# Patient Record
Sex: FEMALE | Race: WHITE | NOT HISPANIC OR LATINO
[De-identification: names, ages, dates, MRNs, and addresses within clinical notes are randomized per-mention and may not be internally consistent; named-entity substitution may affect disease eponyms.]

---

## 2017-03-29 ENCOUNTER — OTHER (OUTPATIENT)
Age: 62
End: 2017-03-29

## 2017-04-06 ENCOUNTER — APPOINTMENT (OUTPATIENT)
Dept: ORTHOPEDIC SURGERY | Facility: CLINIC | Age: 62
End: 2017-04-06

## 2017-04-12 ENCOUNTER — OTHER (OUTPATIENT)
Age: 62
End: 2017-04-12

## 2017-04-20 ENCOUNTER — APPOINTMENT (OUTPATIENT)
Dept: ORTHOPEDIC SURGERY | Facility: CLINIC | Age: 62
End: 2017-04-20

## 2017-04-20 VITALS
BODY MASS INDEX: 21.53 KG/M2 | HEART RATE: 82 BPM | DIASTOLIC BLOOD PRESSURE: 77 MMHG | HEIGHT: 62 IN | WEIGHT: 117 LBS | SYSTOLIC BLOOD PRESSURE: 135 MMHG

## 2017-05-16 ENCOUNTER — APPOINTMENT (OUTPATIENT)
Dept: OBGYN | Facility: CLINIC | Age: 62
End: 2017-05-16

## 2017-05-16 VITALS
SYSTOLIC BLOOD PRESSURE: 126 MMHG | WEIGHT: 118 LBS | DIASTOLIC BLOOD PRESSURE: 80 MMHG | BODY MASS INDEX: 21.71 KG/M2 | HEIGHT: 62 IN

## 2017-05-16 DIAGNOSIS — M25.551 PAIN IN RIGHT HIP: ICD-10-CM

## 2017-05-16 DIAGNOSIS — Z96.649 AFTERCARE FOLLOWING JOINT REPLACEMENT SURGERY: ICD-10-CM

## 2017-05-16 DIAGNOSIS — Z87.39 PERSONAL HISTORY OF OTHER DISEASES OF THE MUSCULOSKELETAL SYSTEM AND CONNECTIVE TISSUE: ICD-10-CM

## 2017-05-16 DIAGNOSIS — Z47.1 AFTERCARE FOLLOWING JOINT REPLACEMENT SURGERY: ICD-10-CM

## 2017-05-16 LAB
BILIRUB UR QL STRIP: NORMAL
COLLECTION METHOD: NORMAL
GLUCOSE UR-MCNC: NORMAL
HCG UR QL: 0.2 EU/DL
HGB UR QL STRIP.AUTO: NORMAL
KETONES UR-MCNC: NORMAL
LEUKOCYTE ESTERASE UR QL STRIP: NORMAL
NITRITE UR QL STRIP: NORMAL
PH UR STRIP: 7
PROT UR STRIP-MCNC: NORMAL
SP GR UR STRIP: 1.01

## 2017-05-22 LAB
CYTOLOGY CVX/VAG DOC THIN PREP: NORMAL
HPV HIGH+LOW RISK DNA PNL CVX: NEGATIVE

## 2018-03-14 ENCOUNTER — OTHER (OUTPATIENT)
Age: 63
End: 2018-03-14

## 2018-05-02 ENCOUNTER — OTHER (OUTPATIENT)
Age: 63
End: 2018-05-02

## 2018-05-10 ENCOUNTER — APPOINTMENT (OUTPATIENT)
Dept: ORTHOPEDIC SURGERY | Facility: CLINIC | Age: 63
End: 2018-05-10
Payer: COMMERCIAL

## 2018-05-10 VITALS
DIASTOLIC BLOOD PRESSURE: 86 MMHG | HEART RATE: 83 BPM | HEIGHT: 62 IN | SYSTOLIC BLOOD PRESSURE: 148 MMHG | BODY MASS INDEX: 21.71 KG/M2 | WEIGHT: 118 LBS

## 2018-05-10 DIAGNOSIS — M70.70 OTHER BURSITIS OF HIP, UNSPECIFIED HIP: ICD-10-CM

## 2018-05-10 PROCEDURE — 20610 DRAIN/INJ JOINT/BURSA W/O US: CPT | Mod: RT

## 2018-05-10 PROCEDURE — 99213 OFFICE O/P EST LOW 20 MIN: CPT | Mod: 25

## 2018-05-10 PROCEDURE — 73502 X-RAY EXAM HIP UNI 2-3 VIEWS: CPT | Mod: RT

## 2018-08-09 ENCOUNTER — APPOINTMENT (OUTPATIENT)
Dept: OBGYN | Facility: CLINIC | Age: 63
End: 2018-08-09
Payer: COMMERCIAL

## 2018-08-09 VITALS
WEIGHT: 130 LBS | DIASTOLIC BLOOD PRESSURE: 75 MMHG | HEIGHT: 62 IN | SYSTOLIC BLOOD PRESSURE: 126 MMHG | BODY MASS INDEX: 23.92 KG/M2

## 2018-08-09 DIAGNOSIS — Z12.11 ENCOUNTER FOR SCREENING FOR MALIGNANT NEOPLASM OF COLON: ICD-10-CM

## 2018-08-09 DIAGNOSIS — Z01.419 ENCOUNTER FOR GYNECOLOGICAL EXAMINATION (GENERAL) (ROUTINE) W/OUT ABNORMAL FINDINGS: ICD-10-CM

## 2018-08-09 DIAGNOSIS — Z12.31 ENCOUNTER FOR SCREENING MAMMOGRAM FOR MALIGNANT NEOPLASM OF BREAST: ICD-10-CM

## 2018-08-09 LAB
BILIRUB UR QL STRIP: NORMAL
COLLECTION METHOD: NORMAL
GLUCOSE UR-MCNC: NORMAL
HCG UR QL: 0.2 EU/DL
HEMOCCULT SP1 STL QL: NEGATIVE
HGB UR QL STRIP.AUTO: NORMAL
KETONES UR-MCNC: NORMAL
LEUKOCYTE ESTERASE UR QL STRIP: NORMAL
NITRITE UR QL STRIP: NORMAL
PH UR STRIP: 7.5
PROT UR STRIP-MCNC: NORMAL
SP GR UR STRIP: 1.01

## 2018-08-09 PROCEDURE — 81003 URINALYSIS AUTO W/O SCOPE: CPT | Mod: QW

## 2018-08-09 PROCEDURE — 99396 PREV VISIT EST AGE 40-64: CPT

## 2018-08-09 PROCEDURE — 82270 OCCULT BLOOD FECES: CPT

## 2018-08-13 LAB
APPEARANCE: CLEAR
BACTERIA UR CULT: NORMAL
BACTERIA: NEGATIVE
BILIRUBIN URINE: NEGATIVE
BLOOD URINE: NEGATIVE
COLOR: YELLOW
GLUCOSE QUALITATIVE U: NEGATIVE MG/DL
HYALINE CASTS: 1 /LPF
KETONES URINE: NEGATIVE
LEUKOCYTE ESTERASE URINE: NEGATIVE
MICROSCOPIC-UA: NORMAL
NITRITE URINE: NEGATIVE
PH URINE: 7.5
PROTEIN URINE: NEGATIVE MG/DL
RED BLOOD CELLS URINE: 1 /HPF
SPECIFIC GRAVITY URINE: 1.01
SQUAMOUS EPITHELIAL CELLS: 0 /HPF
UROBILINOGEN URINE: NEGATIVE MG/DL
WHITE BLOOD CELLS URINE: 0 /HPF

## 2019-03-19 ENCOUNTER — OTHER (OUTPATIENT)
Age: 64
End: 2019-03-19

## 2019-03-19 DIAGNOSIS — M25.569 PAIN IN UNSPECIFIED KNEE: ICD-10-CM

## 2019-03-26 ENCOUNTER — TRANSCRIPTION ENCOUNTER (OUTPATIENT)
Age: 64
End: 2019-03-26

## 2019-03-26 ENCOUNTER — APPOINTMENT (OUTPATIENT)
Dept: ORTHOPEDIC SURGERY | Facility: CLINIC | Age: 64
End: 2019-03-26
Payer: MEDICARE

## 2019-03-26 VITALS
BODY MASS INDEX: 23.92 KG/M2 | SYSTOLIC BLOOD PRESSURE: 150 MMHG | DIASTOLIC BLOOD PRESSURE: 88 MMHG | HEIGHT: 62 IN | WEIGHT: 130 LBS | TEMPERATURE: 99.5 F | HEART RATE: 84 BPM

## 2019-03-26 DIAGNOSIS — M23.92 UNSPECIFIED INTERNAL DERANGEMENT OF LEFT KNEE: ICD-10-CM

## 2019-03-26 PROCEDURE — 99214 OFFICE O/P EST MOD 30 MIN: CPT

## 2019-03-26 PROCEDURE — 73564 X-RAY EXAM KNEE 4 OR MORE: CPT

## 2019-03-26 NOTE — ADDENDUM
[FreeTextEntry1] : This note was authored by Case Brown working as a medical scribe for Dr. Alexandre Lang. The note was reviewed, edited, and revised by Dr. Alexandre Lang whom is in agreement with the exam findings, imaging findings, and treatment plan. 03/26/2019.

## 2019-03-26 NOTE — DISCUSSION/SUMMARY
[de-identified] : The patient is a 64 year old female with early patellofemoral compartment arthritis and a possible medial meniscal tear of the left knee. She was sent for an MRI to evaluate this. She will follow up with the results of the MRI.

## 2019-03-26 NOTE — HISTORY OF PRESENT ILLNESS
[de-identified] : The patient is a 64 year old female being seen for evaluation of her left knee.She reports being diagnosed with lupus in November of 2018. She's always noted intermittent pain to both knees. In approximately 2 months ago she twisted her left knee while attempting to transfer out of bed. Since that time she's noted pain over the anterior aspect of the knee. She describes it as constant. It is worse with prolonged sitting, standing and weightbearing activities, most especially stair climbing. She reports associated swelling and weakness. She denies limping. She is unable to take anti-inflammatories due to stomach issues. She is on pain management and takes OxyContin and oxycodone.

## 2019-03-26 NOTE — PHYSICAL EXAM
[de-identified] : The patient appears well nourished  and in no apparent distress.  The patient is alert and oriented to person, place, and time.   Affect and mood appear normal. The head is normocephalic and atraumatic.  The eyes reveal normal sclera and extra ocular muscles are intact. The tongue is midline with no apparent lesions.  Skin shows normal turgor with no evidence of eczema or psoriasis.  No respiratory distress noted.  Sensation grossly intact.\par   [de-identified] : Exam of the left knee shows no effusion, positive patella grind, anterior knee pain with deep flexion, pain with palpation along the medial joint line, equivocal yadira with medial joint line pain during testing. 5/5 motor strength bilaterally distally. Sensation intact distally.  [de-identified] : Xray- 4 views of the left knee shows early arthritis of the patellofemoral compartment of the left knee.

## 2019-03-28 ENCOUNTER — APPOINTMENT (OUTPATIENT)
Dept: MRI IMAGING | Facility: CLINIC | Age: 64
End: 2019-03-28
Payer: MEDICARE

## 2019-03-28 ENCOUNTER — OUTPATIENT (OUTPATIENT)
Dept: OUTPATIENT SERVICES | Facility: HOSPITAL | Age: 64
LOS: 1 days | End: 2019-03-28
Payer: MEDICARE

## 2019-03-28 DIAGNOSIS — Z00.8 ENCOUNTER FOR OTHER GENERAL EXAMINATION: ICD-10-CM

## 2019-03-28 DIAGNOSIS — Z98.51 TUBAL LIGATION STATUS: Chronic | ICD-10-CM

## 2019-03-28 DIAGNOSIS — M23.92 UNSPECIFIED INTERNAL DERANGEMENT OF LEFT KNEE: ICD-10-CM

## 2019-03-28 PROCEDURE — 73721 MRI JNT OF LWR EXTRE W/O DYE: CPT | Mod: 26,LT

## 2019-03-28 PROCEDURE — 73721 MRI JNT OF LWR EXTRE W/O DYE: CPT

## 2019-04-01 ENCOUNTER — OTHER (OUTPATIENT)
Age: 64
End: 2019-04-01

## 2019-04-02 ENCOUNTER — APPOINTMENT (OUTPATIENT)
Age: 64
End: 2019-04-02
Payer: MEDICARE

## 2019-04-02 VITALS
DIASTOLIC BLOOD PRESSURE: 83 MMHG | TEMPERATURE: 98.1 F | HEART RATE: 93 BPM | BODY MASS INDEX: 23.92 KG/M2 | SYSTOLIC BLOOD PRESSURE: 152 MMHG | WEIGHT: 130 LBS | HEIGHT: 62 IN

## 2019-04-02 PROCEDURE — 20610 DRAIN/INJ JOINT/BURSA W/O US: CPT

## 2019-04-02 PROCEDURE — 99213 OFFICE O/P EST LOW 20 MIN: CPT | Mod: 25

## 2019-04-02 NOTE — PHYSICAL EXAM
[de-identified] : The patient appears well nourished  and in no apparent distress.  The patient is alert and oriented to person, place, and time.   Affect and mood appear normal. The head is normocephalic and atraumatic.  The eyes reveal normal sclera and extra ocular muscles are intact. The tongue is midline with no apparent lesions.  Skin shows normal turgor with no evidence of eczema or psoriasis.  No respiratory distress noted.  Sensation grossly intact.\par   [de-identified] : Exam of the left knee shows no effusion, positive patella grind, anterior knee pain with deep flexion, pain with palpation along the medial joint line. 5/5 motor strength bilaterally distally. Sensation intact distally.  [de-identified] : MRI - Left knee March 20, 2019 shows multiple moderate knee effusion.  Fissuring cartilage medial facet patella.  No displaced meniscal tear.\par \par

## 2019-04-02 NOTE — ADDENDUM
[FreeTextEntry1] : This note was authored by Case Brown working as a medical scribe for Dr. Alexandre Lang. The note was reviewed, edited, and revised by Dr. Alexandre Lang whom is in agreement with the exam findings, imaging findings, and treatment plan. 04/02/2019.

## 2019-04-02 NOTE — PROCEDURE
[de-identified] : Using sterile technique, 2cc of depomedrol 40mg/ml, 4cc of 1% plain lidocaine, and 2 cc 0.25% marcaine was drawn up into a sterile syringe. The left knee was then sterilely prepped with chlorhexidine. Ethyl chloride spray was used to anesthetize the skin and subQ tissue. The depomedrol/lidocaine/marcaine mixture was then injected into the knee joint in the anterolateral position. The patient tolerated the procedure well without difficulty. The patient was given instructions on the use of ice and anti-inflammatories post injection site soreness.\par \par

## 2019-04-02 NOTE — HISTORY OF PRESENT ILLNESS
[de-identified] : The patient is a 64 year old female being seen for evaluation of her left knee. Last seen in the office 1 week ago at which time she was sent for an MRI to evaluate for a meniscus tear of the left knee. She reports her pain has remained constant since her last visit. She is ambulating and transferring with intermittent pain. She comes in today to review the results of the MRI and for further treatment options.

## 2019-04-02 NOTE — PHYSICAL EXAM
[de-identified] : The patient appears well nourished  and in no apparent distress.  The patient is alert and oriented to person, place, and time.   Affect and mood appear normal. The head is normocephalic and atraumatic.  The eyes reveal normal sclera and extra ocular muscles are intact. The tongue is midline with no apparent lesions.  Skin shows normal turgor with no evidence of eczema or psoriasis.  No respiratory distress noted.  Sensation grossly intact.\par   [de-identified] : Exam of the left knee shows no effusion, positive patella grind, anterior knee pain with deep flexion, pain with palpation along the medial joint line. 5/5 motor strength bilaterally distally. Sensation intact distally.  [de-identified] : MRI - Left knee March 20, 2019 shows multiple moderate knee effusion.  Fissuring cartilage medial facet patella.  No displaced meniscal tear.\par \par

## 2019-04-02 NOTE — DISCUSSION/SUMMARY
[de-identified] : The patient is a 64 year old female with early arthritis of the patellofemoral compartment of the left knee. Conservative options were discussed. She was given a cortisone injection in the left knee today. She was given a prescription for physical therapy but declined. She may follow up as needed.

## 2019-04-02 NOTE — PROCEDURE
[de-identified] : Using sterile technique, 2cc of depomedrol 40mg/ml, 4cc of 1% plain lidocaine, and 2 cc 0.25% marcaine was drawn up into a sterile syringe. The left knee was then sterilely prepped with chlorhexidine. Ethyl chloride spray was used to anesthetize the skin and subQ tissue. The depomedrol/lidocaine/marcaine mixture was then injected into the knee joint in the anterolateral position. The patient tolerated the procedure well without difficulty. The patient was given instructions on the use of ice and anti-inflammatories post injection site soreness.\par \par

## 2019-04-02 NOTE — HISTORY OF PRESENT ILLNESS
[de-identified] : The patient is a 64 year old female being seen for evaluation of her left knee. Last seen in the office 1 week ago at which time she was sent for an MRI to evaluate for a meniscus tear of the left knee. She reports her pain has remained constant since her last visit. She is ambulating and transferring with intermittent pain. She comes in today to review the results of the MRI and for further treatment options.

## 2019-04-02 NOTE — DISCUSSION/SUMMARY
[de-identified] : The patient is a 64 year old female with early arthritis of the patellofemoral compartment of the left knee. Conservative options were discussed. She was given a cortisone injection in the left knee today. She was given a prescription for physical therapy but declined. She may follow up as needed.

## 2019-08-12 ENCOUNTER — APPOINTMENT (OUTPATIENT)
Dept: OBGYN | Facility: CLINIC | Age: 64
End: 2019-08-12

## 2019-11-12 ENCOUNTER — OTHER (OUTPATIENT)
Age: 64
End: 2019-11-12

## 2019-11-19 ENCOUNTER — APPOINTMENT (OUTPATIENT)
Dept: ORTHOPEDIC SURGERY | Facility: CLINIC | Age: 64
End: 2019-11-19
Payer: MEDICARE

## 2019-11-19 VITALS
HEIGHT: 62 IN | SYSTOLIC BLOOD PRESSURE: 144 MMHG | BODY MASS INDEX: 23.92 KG/M2 | HEART RATE: 87 BPM | DIASTOLIC BLOOD PRESSURE: 83 MMHG | WEIGHT: 130 LBS

## 2019-11-19 PROCEDURE — 20611 DRAIN/INJ JOINT/BURSA W/US: CPT

## 2019-11-19 PROCEDURE — 99213 OFFICE O/P EST LOW 20 MIN: CPT | Mod: 25

## 2019-11-19 PROCEDURE — 73564 X-RAY EXAM KNEE 4 OR MORE: CPT | Mod: LT

## 2019-11-19 NOTE — ADDENDUM
[FreeTextEntry1] : This note was authored by Case Brown working as a medical scribe for Dr. Alexandre Lang. The note was reviewed, edited, and revised by Dr. Alexandre Lang whom is in agreement with the exam findings, imaging findings, and treatment plan. 11/19/2019.

## 2019-11-19 NOTE — PHYSICAL EXAM
[de-identified] : The patient appears well nourished  and in no apparent distress.  The patient is alert and oriented to person, place, and time.   Affect and mood appear normal. The head is normocephalic and atraumatic.  The eyes reveal normal sclera and extra ocular muscles are intact. The tongue is midline with no apparent lesions.  Skin shows normal turgor with no evidence of eczema or psoriasis.  No respiratory distress noted.  Sensation grossly intact.\par   [de-identified] : Exam of the left knee shows a minimal effusion, anteromedial and medial joint line tenderness to palpation, full extension, flexion of 135 degrees. 5/5 motor strength bilaterally distally. Sensation intact distally.  [de-identified] : Xray- 4 views of the left knee shows mild arthritis of the medial and patellofemoral compartment of the left knee.

## 2019-11-19 NOTE — HISTORY OF PRESENT ILLNESS
[de-identified] : The patient is a 64 year old female being seen for evaluation of her left knee. She denies injury, trauma, or change of activity. Last seen in the office in April of this year at which time she received a cortisone injection in the left knee. She reports 6 months of good relief of symptoms. She is ambulating and transferring well, but continues to report pain and stiffness. She reports associated swelling. She reports pain is centered in the medial aspect of the right knee. She reports taking anti-inflammatories with mild relief of symptoms. She comes in today for repeat evaluation and for further treatment options.  She reports being recently told by her rheumatologist that she has rheumatoid arthritis.  She was just started on methotrexate.

## 2019-11-19 NOTE — PROCEDURE
[de-identified] : Using sterile technique, 2cc of depomedrol 40mg/ml, 4cc of 1% plain lidocaine, and 2 cc 0.25% marcaine was drawn up into a sterile syringe. The left knee joint space was identified using ultrasound. The left knee was then sterilely prepped with chlorhexidine. Ethyl chloride spray was used to anesthetize the skin and subQ tissue. The depomedrol/lidocaine/marcaine mixture was then injected into the knee joint in the superolateral position under ultrasound guidance and the needle position in the joint was confirmed. The patient tolerated the procedure well without difficulty. The patient was given instructions on the use of ice and anti-inflammatories post injection site soreness.

## 2019-11-19 NOTE — REASON FOR VISIT
[Other: ____] : [unfilled] [Follow-Up Visit] : a follow-up visit for [FreeTextEntry2] : S/P Right direct anterior approach total hip replacement. DOS: 03/18/2016. \par \par

## 2019-11-19 NOTE — DISCUSSION/SUMMARY
[de-identified] : The patient is a 64 year old female with mild arthritis of the medial and patellofemoral compartment of the left knee. Conservative options were discussed. She was given a cortisone injection in the left knee under ultrasound-guidance as she had 6 months of good relief from her previous injection. She may follow up as needed.

## 2020-02-26 ENCOUNTER — APPOINTMENT (OUTPATIENT)
Dept: OBGYN | Facility: CLINIC | Age: 65
End: 2020-02-26

## 2020-09-29 ENCOUNTER — APPOINTMENT (OUTPATIENT)
Dept: ORTHOPEDIC SURGERY | Facility: CLINIC | Age: 65
End: 2020-09-29
Payer: MEDICARE

## 2020-09-29 VITALS
BODY MASS INDEX: 23.92 KG/M2 | DIASTOLIC BLOOD PRESSURE: 81 MMHG | WEIGHT: 130 LBS | HEART RATE: 71 BPM | SYSTOLIC BLOOD PRESSURE: 148 MMHG | HEIGHT: 62 IN

## 2020-09-29 DIAGNOSIS — M25.551 PAIN IN RIGHT HIP: ICD-10-CM

## 2020-09-29 DIAGNOSIS — M25.561 PAIN IN RIGHT KNEE: ICD-10-CM

## 2020-09-29 PROCEDURE — 73564 X-RAY EXAM KNEE 4 OR MORE: CPT | Mod: RT

## 2020-09-29 PROCEDURE — 20610 DRAIN/INJ JOINT/BURSA W/O US: CPT | Mod: RT

## 2020-09-29 PROCEDURE — 76942 ECHO GUIDE FOR BIOPSY: CPT | Mod: RT,59

## 2020-09-29 PROCEDURE — 99214 OFFICE O/P EST MOD 30 MIN: CPT | Mod: 25

## 2020-09-29 NOTE — REASON FOR VISIT
[Follow-Up Visit] : a follow-up visit for [Other: ____] : [unfilled] [FreeTextEntry2] : S/P Right direct anterior approach total hip replacement. DOS: 03/18/2016.

## 2020-09-29 NOTE — PROCEDURE
[de-identified] : Using sterile technique, 2cc of depomedrol 40mg/ml, 4cc of 1% plain lidocaine, and 2 cc 0.25% marcaine was drawn up into a sterile syringe. The right knee joint space was identified using ultrasound. The right knee was then sterilely prepped with chlorhexidine. Ethyl chloride spray was used to anesthetize the skin and subQ tissue. The depomedrol/lidocaine/marcaine mixture was then injected into the knee joint in the superolateral position under ultrasound guidance and the needle position in the joint space was confirmed. The patient tolerated the procedure well without difficulty. The patient was given instructions on the use of ice and anti-inflammatories post injection site soreness.

## 2020-09-29 NOTE — PHYSICAL EXAM
[de-identified] : The patient appears well nourished  and in no apparent distress.  The patient is alert and oriented to person, place, and time.   Affect and mood appear normal. The head is normocephalic and atraumatic.  The eyes reveal normal sclera and extra ocular muscles are intact. The tongue is midline with no apparent lesions.  Skin shows normal turgor with no evidence of eczema or psoriasis.  No respiratory distress noted.  Sensation grossly intact.\par   [de-identified] : Exam of the right knee shows full extension, medial joint line tenderness to palpation, flexion of 130 degrees with patellofemoral crepitus. 5/5 motor strength bilaterally distally. Sensation intact distally.  [de-identified] : Xray- 4 views of the right knee shows moderate arthritis of the medial compartment of the right knee.

## 2020-09-29 NOTE — ADDENDUM
[FreeTextEntry1] : This note was authored by Case Brown working as a medical scribe for Dr. Alexandre Lang. The note was reviewed, edited, and revised by Dr. Alexandre Lang whom is in agreement with the exam findings, imaging findings, and treatment plan. 09/29/2020.

## 2020-09-29 NOTE — HISTORY OF PRESENT ILLNESS
[de-identified] : The patient is a 65 year old female being seen for evaluation of her right hip. She denies injury, trauma, or change of activity. She has a history of rheumatid arthritis. She is ambulating and transferring with severe pain and stiffness. She reports pain is centered in the medial aspect of the right knee. She reports intermittent swelling. She reports occasional instability. She reports pain at rest. Pain is worse with transferring and weightbearing activities, most especially stair climbing. She reports using oxycodone and OxyContin with mild relief of her symptoms. She comes in today for evaluation of her right knee and for treatment options.

## 2020-09-29 NOTE — DISCUSSION/SUMMARY
[de-identified] : The patient is a 65 year old female with moderate arthritis of the medial compartment of the right knee. She was given a cortisone injection in the right knee today under ultrasound-guidance. She was given a prescription for physical therapy. She was referred to physiatry for evaluation of her lumbar spine.  I recommended postponing surgery as long as possible with nonoperative treatment.  She does not tolerate NSAIDs because of GI upset.  Follow-up in 2 months.

## 2020-10-05 ENCOUNTER — TRANSCRIPTION ENCOUNTER (OUTPATIENT)
Age: 65
End: 2020-10-05

## 2021-01-26 ENCOUNTER — APPOINTMENT (OUTPATIENT)
Dept: ORTHOPEDIC SURGERY | Facility: CLINIC | Age: 66
End: 2021-01-26
Payer: MEDICARE

## 2021-01-26 VITALS
HEIGHT: 62 IN | BODY MASS INDEX: 23.92 KG/M2 | DIASTOLIC BLOOD PRESSURE: 82 MMHG | SYSTOLIC BLOOD PRESSURE: 148 MMHG | HEART RATE: 78 BPM | WEIGHT: 130 LBS

## 2021-01-26 DIAGNOSIS — Z96.649 PRESENCE OF UNSPECIFIED ARTIFICIAL HIP JOINT: ICD-10-CM

## 2021-01-26 PROCEDURE — 73502 X-RAY EXAM HIP UNI 2-3 VIEWS: CPT | Mod: RT

## 2021-01-26 PROCEDURE — 99214 OFFICE O/P EST MOD 30 MIN: CPT | Mod: 25

## 2021-01-26 PROCEDURE — 73564 X-RAY EXAM KNEE 4 OR MORE: CPT | Mod: 50

## 2021-01-26 PROCEDURE — 20610 DRAIN/INJ JOINT/BURSA W/O US: CPT | Mod: 50

## 2021-01-26 NOTE — DISCUSSION/SUMMARY
[de-identified] : The patient is a 65 year old female with possible medial meniscal tears of both knees in the setting of mild to moderate arthritis of the medial compartment of the right knee and mild to moderate medial and patellofemoral compartment arthritis of the left knee. She was given a cortisone injection in both knees today. She was sent for an MRI to evaluate the medial meniscus of both knees. We discussed the use of over-the-counter anti-inflammatories as well as activity modification and ice as needed. She will follow up with the results of the MRIs for further treatment options.

## 2021-01-26 NOTE — PHYSICAL EXAM
[de-identified] : The patient appears well nourished  and in no apparent distress.  The patient is alert and oriented to person, place, and time.   Affect and mood appear normal. The head is normocephalic and atraumatic.  The eyes reveal normal sclera and extra ocular muscles are intact. The tongue is midline with no apparent lesions.  Skin shows normal turgor with no evidence of eczema or psoriasis.  No respiratory distress noted.  Sensation grossly intact.\par   [de-identified] : Exam of the left knee shows no effusion, full extension, medial joint line tenderness to palpation, flexion of 130 degrees.\par Exam of the right knee shows no effusion, full extension, medial joint line tenderness to palpation, flexion of 130 degrees. 5/5 motor strength bilaterally distally. Sensation intact distally.  [de-identified] : Xray- 4 views of the right knee shows mild to moderate arthritis of the medial compartment of the right knee and mild to moderate medial and patellofemoral compartment arthritis of the left knee.\par \par Xray- AP pelvis and 2 views of the right hip shows a hip replacement in stable position, without sign of fracture or dislocation. There is stable grade 2 heterotopic ossification.

## 2021-01-26 NOTE — HISTORY OF PRESENT ILLNESS
[de-identified] : Patient presents today for evaluation of a right anterior total hip arthroplasty. She is approaching 5 years from her surgery. She reports her developing some lateral hip pain about a year ago. She states it radiates distally towards the knee. She is unsure if this is coming from the knee. She is known to have chronic back pain for which she is treated with oxycodone and OxyContin. She reports at times at the right lower extremity does buckle. She denies any trauma or falls to the ground. She does not report any groin pain of the right hip. She reports of chronic knee pain. She was last seen in September and had a corticosteroid injection. That did provide a significant amount of relief. At this time the injection has worn off. She would like to consider having both knees injected today. She does report increasing left knee pain. She describes her knee pain is more lateral than medial. She reports the stiffness when standing for extended periods of time. No knees swelling is reported.

## 2021-01-26 NOTE — ADDENDUM
[FreeTextEntry1] : This note was authored by Case Brown working as a medical scribe for Dr. Alexandre Lang. The note was reviewed, edited, and revised by Dr. Alexandre Lang whom is in agreement with the exam findings, imaging findings, and treatment plan. 01/26/2021.

## 2021-01-26 NOTE — PROCEDURE
[de-identified] : Using sterile technique, 2cc of depomedrol 40mg/ml, 4cc of 1% plain lidocaine, and 2 cc 0.25% marcaine was drawn up into a sterile syringe. The right knee was then sterilely prepped with chlorhexidine. Ethyl chloride spray was used to anesthetize the skin and subQ tissue. The depomedrol/lidocaine/marcaine mixture was then injected into the knee joint in the anterolateral position. The patient tolerated the procedure well without difficulty. The patient was given instructions on the use of ice and anti-inflammatories post injection site soreness.\par \par Using sterile technique, 2cc of depomedrol 40mg/ml, 4cc of 1% plain lidocaine, and 2 cc 0.25% marcaine was drawn up into a sterile syringe. The left knee was then sterilely prepped with chlorhexidine. Ethyl chloride spray was used to anesthetize the skin and subQ tissue. The depomedrol/lidocaine/marcaine mixture was then injected into the knee joint in the anterolateral position. The patient tolerated the procedure well without difficulty. The patient was given instructions on the use of ice and anti-inflammatories post injection site soreness.\par \par

## 2021-01-26 NOTE — REASON FOR VISIT
[Follow-Up Visit] : a follow-up visit for [Other: ____] : [unfilled] [FreeTextEntry2] : S/P Right direct anterior approach total hip replacement. DOS: 03/18/2016. \par  \par

## 2021-03-09 ENCOUNTER — APPOINTMENT (OUTPATIENT)
Dept: ORTHOPEDIC SURGERY | Facility: CLINIC | Age: 66
End: 2021-03-09
Payer: MEDICARE

## 2021-03-09 PROCEDURE — 99213 OFFICE O/P EST LOW 20 MIN: CPT | Mod: 25

## 2021-03-09 PROCEDURE — 20611 DRAIN/INJ JOINT/BURSA W/US: CPT | Mod: 50

## 2021-03-09 NOTE — DISCUSSION/SUMMARY
[de-identified] : The patient is a 66 year old female with mild to moderate medial compartment arthritis of both knees. Conservative options were discussed. She began Visco Supplementation in both knees today. We discussed the use of over-the-counter anti-inflammatories as well as activity modification and ice as needed. She will follow up next week for her second injection of Visco Supplementation.

## 2021-03-09 NOTE — REASON FOR VISIT
[Follow-Up Visit] : a follow-up visit for [Other: ____] : [unfilled] [FreeTextEntry2] : right hip MRI review.

## 2021-03-09 NOTE — ADDENDUM
[FreeTextEntry1] : This note was authored by Case Brown working as a medical scribe for Dr. Alexandre Lang. The note was reviewed, edited, and revised by Dr. Alexandre Lang whom is in agreement with the exam findings, imaging findings, and treatment plan. 03/09/2021.

## 2021-03-09 NOTE — PHYSICAL EXAM
[de-identified] : The patient appears well nourished  and in no apparent distress.  The patient is alert and oriented to person, place, and time.   Affect and mood appear normal. The head is normocephalic and atraumatic.  The eyes reveal normal sclera and extra ocular muscles are intact. The tongue is midline with no apparent lesions.  Skin shows normal turgor with no evidence of eczema or psoriasis.  No respiratory distress noted.  Sensation grossly intact.\par   [de-identified] : Exam of the left knee shows full extension, patellofemoral crepitus, flexion of 135 degrees.\par Exam of the right knee shows full extension, patellofemoral crepitus, flexion of 135 degrees. 5/5 motor strength bilaterally distally. Sensation intact distally.  [de-identified] : MRI - performed at [facility] on 2/8/2021 of the left knee- \par Mild tricompartmental degenerative changes most pronounced medial femorotibial compartment.\par Small to moderate knee joint effusion.\par \par MRI - performed at [facility] on 2/8/2021 of the right knee- \par Mild tricompartmental degenerative changes, most pronounced in the medial femorotibial compartment.\par Mild truncation of the free edge body medial meniscus.

## 2021-03-09 NOTE — PROCEDURE
[de-identified] : Allergies: The patient denies allergies to medications and has no allergies to chicken,eggs, or feathers.\par Procedure: The patient has been identified by name and date of birth. Patient confirms that we are treating the bilateral knees today.\par The bilateral knees were prepped in the usual sterile fashion. The bilateral knees joint space was identified using ultrasound. The areas were cleansed with chlorhexadine, then sprayed with ethyl chloride. The patient was then injected from a superolateral approach with the supartz into the bilateral knees using ultrasound guidance and the needle position in the joint space was confirmed. The patient tolerated the procedures well. The medication was delivered aseptically and atraumatically.\par Diagnosis: Osteoarthritis of the bilateral knees\par Treatment: The patient was advised on the activities for today. I gave the patient instructions on postinjection ice and analgesia.\par

## 2021-03-09 NOTE — HISTORY OF PRESENT ILLNESS
[de-identified] : Patient presents today for evaluation of a right anterior total hip arthroplasty. She is approaching 5 years from her surgery, presenting for follow up evaluation of bilateral knee pain. Patient was seen in office in January of 2021 and received a depo medrol injection to the bilateral knees. She notes approximately one month of pain relief, but has now worn off. She has not had gel injections but is interested in discussing them. She has also tried PT and home exercise without relief. Patient takes OTC NSAIDs and Tylenol as needed for pain with only temporary relief. SHe was sent for MRI of the bilateral knees to rule out medial meniscus tears, and presents today for follow up.

## 2021-03-16 ENCOUNTER — APPOINTMENT (OUTPATIENT)
Dept: ORTHOPEDIC SURGERY | Facility: CLINIC | Age: 66
End: 2021-03-16
Payer: MEDICARE

## 2021-03-16 PROCEDURE — 20611 DRAIN/INJ JOINT/BURSA W/US: CPT | Mod: 50

## 2021-03-16 NOTE — REASON FOR VISIT
[Follow-Up Visit] : a follow-up visit for [Other: ____] : [unfilled] [FreeTextEntry2] : Bilateral knee Supartz injections 2/5.

## 2021-03-16 NOTE — HISTORY OF PRESENT ILLNESS
[de-identified] : The patient is here today for a Supartz injection for the bilateral knees. The patient is having osteoarthritic symptoms. The patient was seen previously and was indicated for Supartz injections.\par Allergies: The patient denies allergies to medications and has no allergies to chicken,eggs, or feathers.\par Procedure: The patient has been identified by name and date of birth. Patient confirms that we are treating the bilateral knee today.\par The knee was prepped in the usual sterile fashion. The knee joint space was identified using ultrasound. The areas were cleansed with chlorhexadine, then sprayed with ethyl chloride. The patient was then injected with the Supartz into the bilateral knee using ultrasound guidance and the needle position in the joint space was confirmed. The patient tolerated the procedure well. The medication was delivered aseptically and atraumatically.\par Diagnosis: Osteoarthritis of the bilateral knee\par Treatment: The patient was advised on the activities for today. I gave the patient instructions on postinjection ice and analgesia.\par The patient will follow up in one week for their next injection to be administered.

## 2021-03-23 ENCOUNTER — APPOINTMENT (OUTPATIENT)
Dept: ORTHOPEDIC SURGERY | Facility: CLINIC | Age: 66
End: 2021-03-23
Payer: MEDICARE

## 2021-03-23 DIAGNOSIS — M17.12 UNILATERAL PRIMARY OSTEOARTHRITIS, LEFT KNEE: ICD-10-CM

## 2021-03-23 DIAGNOSIS — M17.11 UNILATERAL PRIMARY OSTEOARTHRITIS, RIGHT KNEE: ICD-10-CM

## 2021-03-23 PROCEDURE — 20611 DRAIN/INJ JOINT/BURSA W/US: CPT | Mod: 50

## 2021-03-23 NOTE — HISTORY OF PRESENT ILLNESS
[de-identified] : The patient is here today for the third Supartz injection for the bilateral knees. The patient is having osteoarthritic symptoms. The patient was seen previously and was indicated for Supartz injections.\par Allergies: The patient denies allergies to medications and has no allergies to chicken,eggs, or feathers.\par Procedure: The patient has been identified by name and date of birth. Patient confirms that we are treating the bilateral knee today.\par The knee was prepped in the usual sterile fashion. The knee joint space was identified using ultrasound. The areas were cleansed with chlorhexadine, then sprayed with ethyl chloride. The patient was then injected with the Supartz into the bilateral knee using ultrasound guidance and the needle position in the joint space was confirmed. The patient tolerated the procedure well. The medication was delivered aseptically and atraumatically.\par Diagnosis: Osteoarthritis of the bilateral knee\par Treatment: The patient was advised on the activities for today. I gave the patient instructions on postinjection ice and analgesia.\par The patient will follow up in one week for their next injection to be administered.

## 2021-03-23 NOTE — REASON FOR VISIT
[Follow-Up Visit] : a follow-up visit for [Other: ____] : [unfilled] [FreeTextEntry2] : Bilateral knee Supartz inx 3/5. LOT: [id number], EXP: 11/30/2023.

## 2021-03-30 ENCOUNTER — APPOINTMENT (OUTPATIENT)
Dept: ORTHOPEDIC SURGERY | Facility: CLINIC | Age: 66
End: 2021-03-30
Payer: MEDICARE

## 2021-03-30 PROCEDURE — 20611 DRAIN/INJ JOINT/BURSA W/US: CPT | Mod: 50

## 2021-03-30 NOTE — REASON FOR VISIT
[Follow-Up Visit] : a follow-up visit for [Other: ____] : [unfilled] [FreeTextEntry2] :  Bilateral knee Supartz inx 4/5. LOT: [id number], EXP: 11/30/2023. \par

## 2021-03-30 NOTE — HISTORY OF PRESENT ILLNESS
[de-identified] : The patient is here today for the fourth Supartz injection for the bilateral knees. The patient is having osteoarthritic symptoms. The patient was seen previously and was indicated for Supartz injections.\par Allergies: The patient denies allergies to medications and has no allergies to chicken,eggs, or feathers.\par Procedure: The patient has been identified by name and date of birth. Patient confirms that we are treating the bilateral knee today.\par The knee was prepped in the usual sterile fashion. The knee joint space was identified using ultrasound. The areas were cleansed with chlorhexadine, then sprayed with ethyl chloride. The patient was then injected with the Supartz into the bilateral knee using ultrasound guidance and the needle position in the joint space was confirmed. The patient tolerated the procedure well. The medication was delivered aseptically and atraumatically.\par Diagnosis: Osteoarthritis of the bilateral knee\par Treatment: The patient was advised on the activities for today. I gave the patient instructions on postinjection ice and analgesia.\par The patient will follow up in one week for their next injection to be administered.

## 2021-04-06 ENCOUNTER — APPOINTMENT (OUTPATIENT)
Dept: ORTHOPEDIC SURGERY | Facility: CLINIC | Age: 66
End: 2021-04-06
Payer: MEDICARE

## 2021-04-06 VITALS
HEART RATE: 86 BPM | DIASTOLIC BLOOD PRESSURE: 77 MMHG | WEIGHT: 130 LBS | BODY MASS INDEX: 23.92 KG/M2 | SYSTOLIC BLOOD PRESSURE: 131 MMHG | HEIGHT: 62 IN

## 2021-04-06 PROCEDURE — 20611 DRAIN/INJ JOINT/BURSA W/US: CPT | Mod: 50

## 2021-04-06 NOTE — HISTORY OF PRESENT ILLNESS
[de-identified] : The patient is here today for the fifth Supartz injection for the bilateral knees. The patient is having osteoarthritic symptoms. The patient was seen previously and was indicated for Supartz injections.\par Allergies: The patient denies allergies to medications and has no allergies to chicken,eggs, or feathers.\par Procedure: The patient has been identified by name and date of birth. Patient confirms that we are treating the bilateral knee today.\par The knee was prepped in the usual sterile fashion. The knee joint space was identified using ultrasound. The areas were cleansed with chlorhexadine, then sprayed with ethyl chloride. The patient was then injected with the Supartz into the bilateral knee using ultrasound guidance and the needle position in the joint space was confirmed. The patient tolerated the procedure well. The medication was delivered aseptically and atraumatically.\par Diagnosis: Osteoarthritis of the bilateral knee\par Treatment: The patient was advised on the activities for today. I gave the patient instructions on postinjection ice and analgesia.\par The patient will follow up in 6 weeks weeks.

## 2021-04-06 NOTE — REASON FOR VISIT
[Follow-Up Visit] : a follow-up visit for [Other: ____] : [unfilled] [FreeTextEntry2] : Bilateral knee Supartz inj 5/5. LOT: [id number], EXP: 11/30/2023.

## 2021-11-16 ENCOUNTER — APPOINTMENT (OUTPATIENT)
Dept: ORTHOPEDIC SURGERY | Facility: CLINIC | Age: 66
End: 2021-11-16

## 2022-02-02 ENCOUNTER — APPOINTMENT (OUTPATIENT)
Dept: OBGYN | Facility: CLINIC | Age: 67
End: 2022-02-02

## 2022-08-02 ENCOUNTER — APPOINTMENT (OUTPATIENT)
Dept: ORTHOPEDIC SURGERY | Facility: CLINIC | Age: 67
End: 2022-08-02

## 2022-08-02 VITALS
SYSTOLIC BLOOD PRESSURE: 139 MMHG | HEIGHT: 62 IN | BODY MASS INDEX: 23.92 KG/M2 | HEART RATE: 79 BPM | DIASTOLIC BLOOD PRESSURE: 77 MMHG | WEIGHT: 130 LBS

## 2022-08-02 PROCEDURE — 20611 DRAIN/INJ JOINT/BURSA W/US: CPT | Mod: 50

## 2022-08-02 PROCEDURE — 99214 OFFICE O/P EST MOD 30 MIN: CPT | Mod: 25

## 2022-08-02 PROCEDURE — 73564 X-RAY EXAM KNEE 4 OR MORE: CPT | Mod: 50

## 2022-08-02 NOTE — PHYSICAL EXAM
[de-identified] : The patient appears well nourished  and in no apparent distress.  The patient is alert and oriented to person, place, and time.   Affect and mood appear normal. The head is normocephalic and atraumatic.  The eyes reveal normal sclera and extra ocular muscles are intact. The tongue is midline with no apparent lesions.  Skin shows normal turgor with no evidence of eczema or psoriasis.  No respiratory distress noted.  Sensation grossly intact.		  [de-identified] : Exam of the right knee shows 0 to 125 degrees of flexion measured with a goniometer. There is no effusion. There is patellofemoral crepitance. \par Exam of the left knee shows 0 to 125 degrees of flexion measured with a goniometer. There is no effusion. There is patellofemoral crepitance (more than on the right). \par 5/5 motor strength bilaterally distally. Sensation intact distally.  [de-identified] : X-ray: 4 views of the right knee demonstrate mild patellofemoral compartment joint space narrowing.		 \par \par X-ray: 4 views of the left knee demonstrate mild patellofemoral compartment joint space narrowing.

## 2022-08-02 NOTE — HISTORY OF PRESENT ILLNESS
[de-identified] : Ms. BETO JONES is a 67 year old female presenting for evaluation of bilateral knee pain. Her pain is localized anteriorly and she notes it is worse with weightbearing activity as well as deep flexion. In the past she was diagnosed with osteoarthritis and treated conservatively thus far.  She completed gel injections to the bilateral knee on April 6, 2021, and reports about a month and a half worth of relief from these.  Prior to that in January 2021 she received steroid injections she states also did not provide lasting relief.  Patient denies she has tried therapy and home exercise without significant relief.  She takes oxycodone as per pain management.  She also has a history of rheumatoid arthritis.

## 2022-08-02 NOTE — PROCEDURE
[de-identified] : Allergies: The patient denies allergies to medications and has no allergies to chicken,eggs, or feathers.\par Procedure: The patient has been identified by name and date of birth. Patient confirms that we are treating the bilateral knee today.\par The knee was prepped in the usual sterile fashion. The knee joint space was identified using ultrasound. The areas were cleansed with chlorhexadine, then sprayed with ethyl chloride. The patient was then injected with the Genvisc into the bilateral knees using ultrasound guidance and the needle position in the superolateral joint space was confirmed. The patient tolerated the procedure well. The medication was delivered aseptically and atraumatically.\par Diagnosis: Osteoarthritis of the bilateral knees\par Treatment: The patient was advised on the activities for today. I gave the patient instructions on postinjection ice and analgesia.

## 2022-08-02 NOTE — DISCUSSION/SUMMARY
[de-identified] : BETO JONES is a 67 year female who presents with bilateral knee mild patellofemoral compartment arthritis. The patient will receive a series of gel injections in the bilateral knees, the first of which was provided in office today. She will receive Genvisc injections for this series. The patient will follow up for subsequent injection.

## 2022-08-02 NOTE — ADDENDUM
[FreeTextEntry1] : This note was authored by Johan Shelley working as a medical scribe for Dr. Alexandre Lang. The note was reviewed, edited, and revised by Dr. Alexandre Lang whom is in agreement with the exam findings, imaging findings, and treatment plan. 08/02/2022

## 2022-08-10 ENCOUNTER — APPOINTMENT (OUTPATIENT)
Dept: ORTHOPEDIC SURGERY | Facility: CLINIC | Age: 67
End: 2022-08-10

## 2022-08-10 VITALS
BODY MASS INDEX: 21.71 KG/M2 | OXYGEN SATURATION: 99 % | HEIGHT: 62 IN | HEART RATE: 67 BPM | SYSTOLIC BLOOD PRESSURE: 127 MMHG | WEIGHT: 118 LBS | DIASTOLIC BLOOD PRESSURE: 74 MMHG

## 2022-08-10 PROCEDURE — 20611 DRAIN/INJ JOINT/BURSA W/US: CPT | Mod: 50

## 2022-08-10 NOTE — PHYSICAL EXAM
[de-identified] : The patient appears well nourished  and in no apparent distress.  The patient is alert and oriented to person, place, and time.   Affect and mood appear normal. The head is normocephalic and atraumatic.  The eyes reveal normal sclera and extra ocular muscles are intact. The tongue is midline with no apparent lesions.  Skin shows normal turgor with no evidence of eczema or psoriasis.  No respiratory distress noted.  Sensation grossly intact.		  [de-identified] : Exam of the right knee shows 0 to 125 degrees of flexion measured with a goniometer. There is no effusion. There is patellofemoral crepitance. \par Exam of the left knee shows 0 to 125 degrees of flexion measured with a goniometer. There is no effusion. There is patellofemoral crepitance (more than on the right). \par 5/5 motor strength bilaterally distally. Sensation intact distally.  [de-identified] : Prior X-ray: 4 views of the right knee demonstrate mild patellofemoral compartment joint space narrowing.		 \par \par Prior X-ray: 4 views of the left knee demonstrate mild patellofemoral compartment joint space narrowing.

## 2022-08-10 NOTE — REASON FOR VISIT
[Follow-Up Visit] : a follow-up visit for [Other: ____] : [unfilled] [FreeTextEntry2] : Bilateral knee GenVisc Injection #1, Lot# [id number], Expires 2024/09/30

## 2022-08-10 NOTE — HISTORY OF PRESENT ILLNESS
[de-identified] : Ms. BETO JONES is a 67 year old female presenting for evaluation of bilateral knee pain. Her pain is localized anteriorly and she notes it is worse with weightbearing activity as well as deep flexion. In the past she was diagnosed with osteoarthritis and treated conservatively thus far.  She completed gel injections to the bilateral knee on April 6, 2021, and reports about a month and a half worth of relief from these.  Prior to that in January 2021 she received steroid injections she states also did not provide lasting relief.  Patient denies she has tried therapy and home exercise without significant relief.  She takes oxycodone as per pain management.  She also has a history of rheumatoid arthritis. Patient presents for the first Genvisc injections to the bilateral knees.

## 2022-08-10 NOTE — PROCEDURE
[de-identified] : Allergies: The patient denies allergies to medications and has no allergies to chicken,eggs, or feathers.\par Procedure: The patient has been identified by name and date of birth. Patient confirms that we are treating the bilateral knee today.\par The knee was prepped in the usual sterile fashion. The knee joint space was identified using ultrasound. The areas were cleansed with chlorhexadine, then sprayed with ethyl chloride. The patient was then injected with the Genvisc into the bilateral knee using ultrasound guidance  and the needle position in the superolateral joint space was confirmed. The patient tolerated the procedure well. The medication was delivered aseptically and atraumatically.\par Diagnosis: Osteoarthritis of the bilateral knee\par Treatment: The patient was advised on the activities for today. I gave the patient instructions on postinjection ice and analgesia.\par

## 2022-08-10 NOTE — DISCUSSION/SUMMARY
[de-identified] : BETO JONES is a 67 year female who presents with bilateral knee mild patellofemoral compartment arthritis. Patient received the first of five gel injections to the bilateral knees using sterile technique. She tolerated well. She will ice and elevate when home. Follow up in 1 week for next injection.

## 2022-08-17 ENCOUNTER — APPOINTMENT (OUTPATIENT)
Dept: ORTHOPEDIC SURGERY | Facility: CLINIC | Age: 67
End: 2022-08-17

## 2022-08-17 VITALS
HEART RATE: 60 BPM | HEIGHT: 62 IN | WEIGHT: 118 LBS | OXYGEN SATURATION: 99 % | BODY MASS INDEX: 21.71 KG/M2 | DIASTOLIC BLOOD PRESSURE: 78 MMHG | SYSTOLIC BLOOD PRESSURE: 130 MMHG

## 2022-08-17 PROCEDURE — 20611 DRAIN/INJ JOINT/BURSA W/US: CPT | Mod: 50

## 2022-08-17 NOTE — HISTORY OF PRESENT ILLNESS
[de-identified] : Patient is here for the bilateral Genvisc injection for the bilateral knees. \par Allergies: The patient denies allergies to medications and has no allergies to chicken,eggs, or feathers.\par Procedure: The patient has been identified by name and date of birth. Patient confirms that we are treating the bilateral knee today.\par The knee was prepped in the usual sterile fashion. The knee joint space was identified using ultrasound. The areas were cleansed with chlorhexadine, then sprayed with ethyl chloride. The patient was then injected with the Genvisc into the bilateral knee using ultrasound guidance  and the needle position in the superolateral joint space was confirmed. The patient tolerated the procedure well. The medication was delivered aseptically and atraumatically.\par Diagnosis: Osteoarthritis of the bilateral knee\par Treatment: The patient was advised on the activities for today. I gave the patient instructions on postinjection ice and analgesia.\par The patient will follow up in 1 week.

## 2022-08-17 NOTE — REASON FOR VISIT
[Other: ____] : [unfilled] [FreeTextEntry2] : Bilateral knee GenVisc Injection#3, Lot#[id number], Expires 2024/11/30

## 2022-08-24 ENCOUNTER — APPOINTMENT (OUTPATIENT)
Dept: ORTHOPEDIC SURGERY | Facility: CLINIC | Age: 67
End: 2022-08-24

## 2022-08-24 VITALS
DIASTOLIC BLOOD PRESSURE: 74 MMHG | OXYGEN SATURATION: 99 % | HEIGHT: 62 IN | BODY MASS INDEX: 21.71 KG/M2 | SYSTOLIC BLOOD PRESSURE: 136 MMHG | HEART RATE: 71 BPM | WEIGHT: 118 LBS

## 2022-08-24 PROCEDURE — 20611 DRAIN/INJ JOINT/BURSA W/US: CPT | Mod: 50

## 2022-08-24 NOTE — REASON FOR VISIT
[Other: ____] : [unfilled] [FreeTextEntry2] : Bilateral knee GenVisc Injection #4, Lot# [id number] Expires 2024/11/30

## 2022-08-24 NOTE — HISTORY OF PRESENT ILLNESS
[de-identified] : Patient is here for the fourth bilateral Genvisc injection for the bilateral knees. \par Allergies: The patient denies allergies to medications and has no allergies to chicken,eggs, or feathers.\par Procedure: The patient has been identified by name and date of birth. Patient confirms that we are treating the bilateral knee today.\par The knee was prepped in the usual sterile fashion. The knee joint space was identified using ultrasound. The areas were cleansed with chlorhexadine, then sprayed with ethyl chloride. The patient was then injected with the Genvisc into the bilateral knee using ultrasound guidance  and the needle position in the superolateral joint space was confirmed. The patient tolerated the procedure well. The medication was delivered aseptically and atraumatically.\par Diagnosis: Osteoarthritis of the bilateral knee\par Treatment: The patient was advised on the activities for today. I gave the patient instructions on postinjection ice and analgesia.\par The patient will follow up in 1 week.

## 2022-08-31 ENCOUNTER — APPOINTMENT (OUTPATIENT)
Dept: ORTHOPEDIC SURGERY | Facility: CLINIC | Age: 67
End: 2022-08-31

## 2022-08-31 VITALS
BODY MASS INDEX: 21.71 KG/M2 | DIASTOLIC BLOOD PRESSURE: 75 MMHG | SYSTOLIC BLOOD PRESSURE: 135 MMHG | HEIGHT: 62 IN | HEART RATE: 62 BPM | OXYGEN SATURATION: 99 % | WEIGHT: 118 LBS

## 2022-08-31 PROCEDURE — 20611 DRAIN/INJ JOINT/BURSA W/US: CPT | Mod: 50

## 2022-08-31 NOTE — REASON FOR VISIT
[Follow-Up Visit] : a follow-up visit for [Other: ____] : [unfilled] [FreeTextEntry2] : Bilateral knee GenVisc Injection #5, Lot#[id number], Expires 2024/09/30 and Lot# [id number] Expires 2024/11/30

## 2022-08-31 NOTE — HISTORY OF PRESENT ILLNESS
[de-identified] : Patient is here for the fifth Genvisc injection for the bilateral knees. \par Allergies: The patient denies allergies to medications and has no allergies to chicken,eggs, or feathers.\par Procedure: The patient has been identified by name and date of birth. Patient confirms that we are treating the bilateral knee today.\par The knee was prepped in the usual sterile fashion. The knee joint space was identified using ultrasound. The areas were cleansed with chlorhexadine, then sprayed with ethyl chloride. The patient was then injected with the Genvisc into the bilateral knee using ultrasound guidance  and the needle position in the superolateral joint space was confirmed. The patient tolerated the procedure well. The medication was delivered aseptically and atraumatically.\par Diagnosis: Osteoarthritis of the bilateral knee\par Treatment: The patient was advised on the activities for today. I gave the patient instructions on postinjection ice and analgesia.\par The patient will follow up in 6-8 weeks.

## 2023-04-11 ENCOUNTER — APPOINTMENT (OUTPATIENT)
Dept: ORTHOPEDIC SURGERY | Facility: CLINIC | Age: 68
End: 2023-04-11
Payer: MEDICARE

## 2023-04-11 VITALS
SYSTOLIC BLOOD PRESSURE: 168 MMHG | BODY MASS INDEX: 21.71 KG/M2 | DIASTOLIC BLOOD PRESSURE: 76 MMHG | WEIGHT: 118 LBS | HEIGHT: 62 IN

## 2023-04-11 PROCEDURE — 20611 DRAIN/INJ JOINT/BURSA W/US: CPT | Mod: 50

## 2023-04-11 PROCEDURE — 76942 ECHO GUIDE FOR BIOPSY: CPT | Mod: 59

## 2023-04-11 PROCEDURE — 73564 X-RAY EXAM KNEE 4 OR MORE: CPT | Mod: 50

## 2023-04-11 PROCEDURE — 99214 OFFICE O/P EST MOD 30 MIN: CPT | Mod: 25

## 2023-04-11 RX ORDER — CETIRIZINE HCL 10 MG
TABLET ORAL
Refills: 0 | Status: DISCONTINUED | COMMUNITY
End: 2023-04-11

## 2023-04-11 RX ORDER — EZETIMIBE 10 MG/1
TABLET ORAL
Refills: 0 | Status: DISCONTINUED | COMMUNITY
End: 2023-04-11

## 2023-04-11 RX ORDER — ESOMEPRAZOLE MAGNESIUM 5 MG/1
GRANULE, DELAYED RELEASE ORAL
Refills: 0 | Status: DISCONTINUED | COMMUNITY
End: 2023-04-11

## 2023-04-11 NOTE — HISTORY OF PRESENT ILLNESS
[de-identified] : Patient is a 68-year-old female presenting for evaluation of bilateral knee pain equal in intensity.  Her knee pain is localized mostly anteriorly and medially. Pain is worse with long periods of sitting and with weightbearing activity. Patient has intermittent swelling as well. Patient had gel injections on 8/31/22 however reports "no recollection of this". She has tired Pt and home exercise without improvement. Patient takes OTC NSAIDs and has tried Pt without improvement.

## 2023-04-11 NOTE — ADDENDUM
[FreeTextEntry1] : This note was authored by Johan Shelley working as a medical scribe for Dr. Alexandre Lang. The note was reviewed, edited, and revised by Dr. Alexandre Lang whom is in agreement with the exam findings, imaging findings, and treatment plan. 04/11/2023

## 2023-04-11 NOTE — PHYSICAL EXAM
[de-identified] : The patient appears well nourished  and in no apparent distress.  The patient is alert and oriented to person, place, and time.   Affect and mood appear normal. The head is normocephalic and atraumatic.  The eyes reveal normal sclera and extra ocular muscles are intact. The tongue is midline with no apparent lesions.  Skin shows normal turgor with no evidence of eczema or psoriasis.  No respiratory distress noted.  Sensation grossly intact.		  [de-identified] : Exam of the right knee shows 0 to 135 degrees of flexion measured with a goniometer. There is no effusion. Mild anterior knee pain with range of motion. There is no medial joint line tenderness. \par Exam of the left knee shows  0 to 135 degrees of flexion measured with a goniometer. There is no effusion. There is no pain with range of motion. There is no medial joint line tenderness. \par 5/5 motor strength bilaterally distally. Sensation intact distally.  [de-identified] : X-ray: 4 views of the right knee demonstrate mild patellofemoral and medial compartment joint space narrowing.		 \par X-ray: 4 views of the left knee demonstrate mild patellofemoral and medial compartment joint space narrowing. 	\par \par Right knee MRI Done by Haider Beltran on 11/14/2023\par Impression and results from my independent review in office today: \par Small knee joint effusion. No evidence for meniscal or ligament tears. 	 \par \par Left knee MRI Done By Haider Beltran on 11/14/2023\par Impression and results from my independent review in office today: \par Horizontal tear involving the posterior horn medial meniscus. \par Mild degenerative changes. \par  Normal for race

## 2023-04-11 NOTE — DISCUSSION/SUMMARY
[de-identified] : BETO JONES is a 68 year old female who presents with bilateral knee mild medial and patellofemoral compartment arthritis. Her pain is disproportionate to the level of arthritis noted on both MRI and x-ray and no other clear etiology for her pain could be determined. It was recommended that the patient follow up with her pain management physician regarding geniculate nerve ablation. For her arthritis, the patient received an intraarticular cortisone injection in the bilateral knees in the office today under ultrasound guidance. The patient will follow up 6 weeks post injection.

## 2023-04-11 NOTE — PROCEDURE
[de-identified] : Using sterile technique, 2cc of depomedrol 40mg/ml, 4cc of 1% plain lidocaine, and 2 cc 0.25% marcaine was drawn up into a sterile syringe. The right knee joint space was identified using ultrasound. The right knee was then sterilely prepped with chlorhexidine. Ethyl chloride spray was used to anesthetize the skin and subQ tissue. The depomedrol/lidocaine/marcaine mixture was then injected into the knee joint in the superolateral position under ultrasound guidance and the needle position in the joint space was confirmed. The patient tolerated the procedure well without difficulty. The patient was given instructions on the use of ice and anti-inflammatories post injection site soreness. \par \par Using sterile technique, 2cc of depomedrol 40mg/ml, 4cc of 1% plain lidocaine, and 2 cc 0.25% marcaine was drawn up into a sterile syringe. The left knee joint space was identified using ultrasound. The left knee was then sterilely prepped with chlorhexidine. Ethyl chloride spray was used to anesthetize the skin and subQ tissue. The depomedrol/lidocaine/marcaine mixture was then injected into the knee joint in the superolateral position under ultrasound guidance and the needle position in the joint space was confirmed. The patient tolerated the procedure well without difficulty. The patient was given instructions on the use of ice and anti-inflammatories post injection site soreness.

## 2023-05-02 ENCOUNTER — APPOINTMENT (OUTPATIENT)
Dept: ORTHOPEDIC SURGERY | Facility: CLINIC | Age: 68
End: 2023-05-02

## 2023-05-30 ENCOUNTER — APPOINTMENT (OUTPATIENT)
Dept: ORTHOPEDIC SURGERY | Facility: CLINIC | Age: 68
End: 2023-05-30
Payer: MEDICARE

## 2023-05-30 VITALS
WEIGHT: 101 LBS | HEIGHT: 62 IN | HEART RATE: 60 BPM | SYSTOLIC BLOOD PRESSURE: 128 MMHG | BODY MASS INDEX: 18.58 KG/M2 | OXYGEN SATURATION: 98 % | DIASTOLIC BLOOD PRESSURE: 72 MMHG

## 2023-05-30 PROCEDURE — 99213 OFFICE O/P EST LOW 20 MIN: CPT

## 2023-05-30 NOTE — DISCUSSION/SUMMARY
[de-identified] : BETO JONES is a 68 year old female who presents with bilateral knee mild medial and patellofemoral compartment arthritis. Her pain is disproportionate to the level of arthritis noted on both MRI and x-ray and no other clear etiology for her pain could be determined. It was recommended that the patient follow up with her pain management physician to see if they will reconsider geniculate nerve ablation. For her arthritis, the patient may follow up in 6 weeks for a repeat cortisone injection.

## 2023-05-30 NOTE — HISTORY OF PRESENT ILLNESS
[de-identified] : Patient is a 68-year-old female presenting for evaluation of bilateral knee pain equal in intensity.  Her knee pain is localized mostly anteriorly and medially. Pain is worse with long periods of sitting and with weightbearing activity. Patient has intermittent swelling as well. Patient had gel injections on 8/31/22 however reports "no recollection of this". She has tired Pt and home exercise without improvement. Patient takes OTC NSAIDs and has tried Pt without improvement. Patient then had a steroid injection on 4/11/23. Patient notes the injections helped somewhat. SHe then went to pain management for consideration of genicular nerve ablation, however was told she is not a good candidate for this.

## 2023-05-30 NOTE — PHYSICAL EXAM
[de-identified] : The patient appears well nourished  and in no apparent distress.  The patient is alert and oriented to person, place, and time.   Affect and mood appear normal. The head is normocephalic and atraumatic.  The eyes reveal normal sclera and extra ocular muscles are intact. The tongue is midline with no apparent lesions.  Skin shows normal turgor with no evidence of eczema or psoriasis.  No respiratory distress noted.  Sensation grossly intact.		  [de-identified] : Exam of the right knee shows 0 to 135 degrees of flexion measured with a goniometer. There is no effusion. Mild anterior knee pain with range of motion. There is no medial joint line tenderness. \par Exam of the left knee shows  0 to 135 degrees of flexion measured with a goniometer. There is no effusion. There is no pain with range of motion. There is no medial joint line tenderness. \par 5/5 motor strength bilaterally distally. Sensation intact distally.  [de-identified] : Prior X-ray: 4 views of the right knee demonstrate mild patellofemoral and medial compartment joint space narrowing.		 \par Prior X-ray: 4 views of the left knee demonstrate mild patellofemoral and medial compartment joint space narrowing. 	\par \par Right knee MRI Done by Haider Beltran on 11/14/2023\par Impression and results from my independent review in office today: \par Small knee joint effusion. No evidence for meniscal or ligament tears. 	 \par \par Left knee MRI Done By Haider Beltran on 11/14/2023\par Impression and results from my independent review in office today: \par Horizontal tear involving the posterior horn medial meniscus. \par Mild degenerative changes. \par

## 2023-05-30 NOTE — REASON FOR VISIT
[Follow-Up Visit] : a follow-up visit for [Other: ____] : [unfilled] [FreeTextEntry2] : S/P Right direct anterior approach total hip replacement; DOS: 03/18/2016.

## 2023-06-21 ENCOUNTER — APPOINTMENT (OUTPATIENT)
Dept: ORTHOPEDIC SURGERY | Facility: CLINIC | Age: 68
End: 2023-06-21

## 2023-07-11 ENCOUNTER — APPOINTMENT (OUTPATIENT)
Dept: ORTHOPEDIC SURGERY | Facility: CLINIC | Age: 68
End: 2023-07-11
Payer: MEDICARE

## 2023-07-11 VITALS
HEIGHT: 62 IN | SYSTOLIC BLOOD PRESSURE: 163 MMHG | WEIGHT: 101 LBS | BODY MASS INDEX: 18.58 KG/M2 | DIASTOLIC BLOOD PRESSURE: 78 MMHG

## 2023-07-11 DIAGNOSIS — M17.0 BILATERAL PRIMARY OSTEOARTHRITIS OF KNEE: ICD-10-CM

## 2023-07-11 PROCEDURE — 20611 DRAIN/INJ JOINT/BURSA W/US: CPT | Mod: 50

## 2023-07-11 PROCEDURE — 99213 OFFICE O/P EST LOW 20 MIN: CPT | Mod: 25

## 2023-07-11 NOTE — HISTORY OF PRESENT ILLNESS
[de-identified] : Patient is a 68-year-old female presenting for evaluation of bilateral knee pain equal in intensity.  Her knee pain is localized mostly anteriorly and medially. Pain is worse with long periods of sitting and with weightbearing activity. Patient has intermittent swelling as well. Patient had gel injections on 8/31/22 however reports "no recollection of this". She has tired Pt and home exercise without improvement. Patient takes OTC NSAIDs and has tried Pt without improvement. Patient then had a steroid injection on 4/11/23. Patient notes the injections helped somewhat. She is hopeful for another steroid injection to the bilateral knee.

## 2023-07-11 NOTE — PROCEDURE
[de-identified] : Using sterile technique, 2cc of depomedrol 40mg/ml, 4cc of 1% plain lidocaine, and 2 cc 0.25% marcaine was drawn up into a sterile syringe. The right knee joint space was identified using ultrasound. The right knee was then sterilely prepped with chlorhexidine. Ethyl chloride spray was used to anesthetize the skin and subQ tissue. The depomedrol/lidocaine/marcaine mixture was then injected into the knee joint in the superolateral position under ultrasound guidance and the needle position in the joint space was confirmed. The patient tolerated the procedure well without difficulty. The patient was given instructions on the use of ice and anti-inflammatories post injection site soreness. \par \par Using sterile technique, 2cc of depomedrol 40mg/ml, 4cc of 1% plain lidocaine, and 2 cc 0.25% marcaine was drawn up into a sterile syringe. The left knee joint space was identified using ultrasound. The left knee was then sterilely prepped with chlorhexidine. Ethyl chloride spray was used to anesthetize the skin and subQ tissue. The depomedrol/lidocaine/marcaine mixture was then injected into the knee joint in the superolateral position under ultrasound guidance and the needle position in the joint space was confirmed. The patient tolerated the procedure well without difficulty. The patient was given instructions on the use of ice and anti-inflammatories post injection site soreness.

## 2023-07-11 NOTE — REASON FOR VISIT
[Follow-Up Visit] : a follow-up visit for [Other: ____] : [unfilled] [FreeTextEntry2] : S/P Right direct anterior approach total hip replacement; DOS: 03/18/2016. \par

## 2023-07-11 NOTE — DISCUSSION/SUMMARY
[de-identified] : BETO JONES is a 68 year old female who presents with bilateral knee mild medial and patellofemoral compartment arthritis. Her pain is disproportionate to the level of arthritis noted on both MRI and x-ray and no other clear etiology for her pain could be determined. It was recommended that the patient follow up with her pain management physician to see if they will reconsider geniculate nerve ablation. For her arthritis, the patient received a steroid injection to the bilateral knee and tolerated well. She will follow up in 6-8 weeks.

## 2023-07-11 NOTE — PHYSICAL EXAM
[de-identified] : The patient appears well nourished  and in no apparent distress.  The patient is alert and oriented to person, place, and time.   Affect and mood appear normal. The head is normocephalic and atraumatic.  The eyes reveal normal sclera and extra ocular muscles are intact. The tongue is midline with no apparent lesions.  Skin shows normal turgor with no evidence of eczema or psoriasis.  No respiratory distress noted.  Sensation grossly intact.		  [de-identified] : Exam of the right knee shows 0 to 135 degrees of flexion measured with a goniometer. There is no effusion. Mild anterior knee pain with range of motion. There is no medial joint line tenderness. \par Exam of the left knee shows  0 to 135 degrees of flexion measured with a goniometer. There is no effusion. There is no pain with range of motion. There is no medial joint line tenderness. \par 5/5 motor strength bilaterally distally. Sensation intact distally.  [de-identified] : Prior X-ray: 4 views of the right knee demonstrate mild patellofemoral and medial compartment joint space narrowing.		 \par Prior X-ray: 4 views of the left knee demonstrate mild patellofemoral and medial compartment joint space narrowing. 	\par \par Right knee MRI Done by Haider Beltran on 11/14/2023\par Impression and results from my independent review in office today: \par Small knee joint effusion. No evidence for meniscal or ligament tears. 	 \par \par Left knee MRI Done By Haider Beltran on 11/14/2023\par Impression and results from my independent review in office today: \par Horizontal tear involving the posterior horn medial meniscus. \par Mild degenerative changes. \par

## 2023-09-18 ENCOUNTER — APPOINTMENT (OUTPATIENT)
Dept: ORTHOPEDIC SURGERY | Facility: CLINIC | Age: 68
End: 2023-09-18